# Patient Record
Sex: FEMALE | Race: WHITE | NOT HISPANIC OR LATINO | Employment: OTHER | URBAN - METROPOLITAN AREA
[De-identification: names, ages, dates, MRNs, and addresses within clinical notes are randomized per-mention and may not be internally consistent; named-entity substitution may affect disease eponyms.]

---

## 2017-07-31 NOTE — PATIENT DISCUSSION
Advised to increase Omega 3 fatty acids (Flaxseed oil) in diet or take supplements. 2-4 grams a day.

## 2018-10-11 NOTE — PROCEDURE NOTE: CLINICAL
PROCEDURE NOTE: Excision of Eyelid Lesion Left Upper Lid. Diagnosis: Eyelid Papilloma. Prior to treatment, the risks/benefits/alternatives were discussed. The patient wished to proceed with procedure. Local anesthetic was given. The eyelid was prepped and draped for procedure. The lesion was incised and removed. The wound was repaired with sutures. Patient tolerated procedure well. There were no complications. Post procedure instructions given. Julieta Gabriel PROCEDURE NOTE: Excision of Eyelid Lesion Right Lower Lid. Diagnosis: Eyelid Papilloma. Prior to treatment, the risks/benefits/alternatives were discussed. The patient wished to proceed with procedure. Local anesthetic was given. The eyelid was prepped and draped for procedure. The lesion was incised and removed. The wound was repaired with sutures. Patient tolerated procedure well. There were no complications. Post procedure instructions given. Julieta Gabriel

## 2018-10-16 NOTE — PATIENT DISCUSSION
This visual field clearly demonstrated a minimum of 47% loss of upper field of vision OU, with upper lid skin in repose and elevated by taping of the lid to demonstrate potential correction. This field shows that taping the lids significantly improved this patient's superior field of vision by approximately 46%, OU.

## 2018-10-22 NOTE — PATIENT DISCUSSION
Recommend Bilateral lower lid blepharoplasty trans conj laser (discussed risks and benefits of sx. .. ).

## 2019-05-06 NOTE — PROCEDURE NOTE: SURGICAL
"<span style=""font-weight: bold;"">MR #:&nbsp;</span> [id number]<br />  <br /> <span style=""font-weight: bold;""> PREOPERATIVE DIAGNOSIS:&nbsp;</span> Dermatochalasis upper lids<br />  <br /> <span style=""font-weight: bold;""> POSTOPERATIVE DIAGNOSIS:&nbsp;</span> Same<br />  <br /> <span style=""font-weight: bold;""> OPERATIVE PROCEDURE:</span>&nbsp;  Upper lid blepharoplasty with fat removal

## 2019-05-13 NOTE — PATIENT DISCUSSION
Recommend Bilateral lower lid blepharoplasty with canthoplasties (discussed risks and benefits of sx. .. ).

## 2019-05-13 NOTE — PATIENT DISCUSSION
Recommend Bilateral lower lid blepharoplasty with canthopexy (discussed risks and benefits of sx. .. ).

## 2019-05-13 NOTE — PATIENT DISCUSSION
Recommend Bilateral lower lid blepharoplasty trans cutaneous (discussed risks and benefits of sx. .. ).

## 2020-10-05 NOTE — PATIENT DISCUSSION
Recommended continue AREDS2 vitamins; recommend weekly Amsler grid use; UV protection; discussed no smoking or second-hand smoke.  Recommended nicotine patch to help stop smoking.

## 2022-02-11 ENCOUNTER — NEW PATIENT (OUTPATIENT)
Dept: URBAN - METROPOLITAN AREA CLINIC 6 | Facility: CLINIC | Age: 47
End: 2022-02-11

## 2022-02-11 DIAGNOSIS — H53.8: ICD-10-CM

## 2022-02-11 DIAGNOSIS — H47.293: ICD-10-CM

## 2022-02-11 PROCEDURE — 92004 COMPRE OPH EXAM NEW PT 1/>: CPT

## 2022-02-11 PROCEDURE — 92083 EXTENDED VISUAL FIELD XM: CPT

## 2022-02-11 ASSESSMENT — VISUAL ACUITY: OU_SC: J16

## 2022-02-11 ASSESSMENT — TONOMETRY
OS_IOP_MMHG: 12
OD_IOP_MMHG: 13

## 2022-02-11 NOTE — PATIENT DISCUSSION
Pt states she has a balanced diet and that she had an MRI of the brain and did not show any abnormalities. Moved to the [facility] recently.

## 2022-02-11 NOTE — PATIENT DISCUSSION
Diffuse pallor OU. Unclear reason. Symptoms present for 1-1.5 years with minimal decline in the past year per patient.

## 2022-02-11 NOTE — PATIENT DISCUSSION
Severe Myopia with astigmatism OU. Quality 226: Preventive Care And Screening: Tobacco Use: Screening And Cessation Intervention: Patient screened for tobacco use and is an ex/non-smoker Quality 137: Melanoma: Continuity Of Care - Recall System: Patient information entered into a recall system that includes: target date for the next exam specified AND a process to follow up with patients regarding missed or unscheduled appointments Quality 111:Pneumonia Vaccination Status For Older Adults: Pneumococcal Vaccination Previously Received Detail Level: Detailed Quality 110: Preventive Care And Screening: Influenza Immunization: Influenza Immunization previously received during influenza season

## 2022-02-11 NOTE — PATIENT DISCUSSION
Discussed importance of maximizing control of vascular risk factors, including blood pressure, cholesterol and blood sugars. Will coordinate care with patient's primary care physician.

## 2022-10-25 NOTE — PATIENT DISCUSSION
When ready for surgery patient is a candidate for GRODON vs CV wisam vs CV OD near target vs B+/B wisam. Not a candidate for Advanced due to Dry AMD.  Patient elects Basic -2.00 goal at the moment.

## 2022-10-25 NOTE — PATIENT DISCUSSION
When ready for surgery patient is a candidate for  GORDON vs CV wisam vs B+/B wisam. Not a candidate for Advanced due to AMD.  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 Goal -2.00. Patient elects Basic -2.00 goal at the moment.

## 2022-11-15 NOTE — PATIENT DISCUSSION
When ready for surgery patient is a candidate for GORDON vs CV wisam vs CV OD near target vs B+/B wisam. Not a candidate for Advanced due to Dry AMD.  Patient elects Basic -2.00 goal at the moment.

## 2022-11-23 NOTE — PATIENT DISCUSSION
Cataract surgery has been performed in the first eye and activities of daily living are still impaired. The patient would like to proceed with cataract surgery in the second eye as scheduled. The patient elects CV OD, goal of Raya.

## 2023-05-10 NOTE — PATIENT DISCUSSION
Pt called and would like to speak to nurse about medication's,please advise    Pt # [phone] Stable.

## 2023-06-01 NOTE — PATIENT DISCUSSION
Cataracts are visually significant and patient is ready to consider surgery; anticipate improved visual acuity but no guarantee of outcome; refer for consult. No
